# Patient Record
Sex: MALE | Race: WHITE
[De-identification: names, ages, dates, MRNs, and addresses within clinical notes are randomized per-mention and may not be internally consistent; named-entity substitution may affect disease eponyms.]

---

## 2021-01-22 ENCOUNTER — HOSPITAL ENCOUNTER (EMERGENCY)
Dept: HOSPITAL 79 - ER1 | Age: 21
Discharge: HOME | End: 2021-01-22
Payer: COMMERCIAL

## 2021-01-22 DIAGNOSIS — J45.909: ICD-10-CM

## 2021-01-22 DIAGNOSIS — S63.501A: Primary | ICD-10-CM
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
## 2021-03-04 ENCOUNTER — HOSPITAL ENCOUNTER (EMERGENCY)
Dept: HOSPITAL 79 - ER1 | Age: 21
Discharge: HOME | End: 2021-03-04
Payer: COMMERCIAL

## 2021-03-04 DIAGNOSIS — F17.290: ICD-10-CM

## 2021-03-04 DIAGNOSIS — R51.9: Primary | ICD-10-CM

## 2021-03-04 DIAGNOSIS — Z20.822: ICD-10-CM

## 2021-03-04 PROCEDURE — U0002 COVID-19 LAB TEST NON-CDC: HCPCS

## 2021-06-23 ENCOUNTER — TRANSCRIBE ORDERS (OUTPATIENT)
Dept: LAB | Facility: HOSPITAL | Age: 21
End: 2021-06-23

## 2021-06-23 ENCOUNTER — HOSPITAL ENCOUNTER (OUTPATIENT)
Dept: GENERAL RADIOLOGY | Facility: HOSPITAL | Age: 21
Discharge: HOME | End: 2021-06-23
Admitting: NURSE PRACTITIONER

## 2021-06-23 DIAGNOSIS — M79.641 PAIN IN RIGHT HAND: Primary | ICD-10-CM

## 2021-06-23 DIAGNOSIS — M79.641 PAIN IN RIGHT HAND: ICD-10-CM

## 2021-06-23 PROCEDURE — 73130 X-RAY EXAM OF HAND: CPT

## 2021-06-23 PROCEDURE — 73130 X-RAY EXAM OF HAND: CPT | Performed by: RADIOLOGY

## 2021-07-29 ENCOUNTER — HOSPITAL ENCOUNTER (EMERGENCY)
Dept: HOSPITAL 79 - ER1 | Age: 21
Discharge: HOME | End: 2021-07-29
Payer: COMMERCIAL

## 2021-07-29 DIAGNOSIS — R53.83: ICD-10-CM

## 2021-07-29 DIAGNOSIS — K59.00: Primary | ICD-10-CM

## 2021-07-29 DIAGNOSIS — R51.9: ICD-10-CM

## 2021-07-29 DIAGNOSIS — Z20.822: ICD-10-CM

## 2021-07-29 DIAGNOSIS — F17.290: ICD-10-CM

## 2021-07-29 LAB
BUN/CREATININE RATIO: 12 (ref 0–10)
HGB BLD-MCNC: 14.9 GM/DL (ref 14–17.5)
RED BLOOD COUNT: 5.08 M/UL (ref 4.2–5.5)
WHITE BLOOD COUNT: 6.9 K/UL (ref 4.5–11)

## 2024-01-09 ENCOUNTER — TELEPHONE (OUTPATIENT)
Dept: UROLOGY | Facility: CLINIC | Age: 24
End: 2024-01-09

## 2024-01-09 NOTE — TELEPHONE ENCOUNTER
Provider: JONNY AVALOS    Caller: Álvaro Kaufman    Relationship to Patient: Self     Phone Number: [phone]    Reason for Call: RESCHEDULE APPOINTMENT    When was the patient last seen: NEW PATIENT    Notes: RESCHEDULED FROM 01/10/24 TO 01/16/24.